# Patient Record
Sex: MALE | URBAN - METROPOLITAN AREA
[De-identification: names, ages, dates, MRNs, and addresses within clinical notes are randomized per-mention and may not be internally consistent; named-entity substitution may affect disease eponyms.]

---

## 2024-04-19 ENCOUNTER — EUS (OUTPATIENT)
Dept: URBAN - METROPOLITAN AREA MEDICAL CENTER 25 | Facility: MEDICAL CENTER | Age: 74
End: 2024-04-19
Payer: MEDICARE

## 2024-04-19 DIAGNOSIS — K86.89 ACUTE PANCREATIC FLUID COLLECTION: ICD-10-CM

## 2024-04-19 DIAGNOSIS — K29.60 ADENOPAPILLOMATOSIS GASTRICA: ICD-10-CM

## 2024-04-19 DIAGNOSIS — K31.7 BENIGN GASTRIC POLYP: ICD-10-CM

## 2024-04-19 PROCEDURE — 43251 EGD REMOVE LESION SNARE: CPT | Performed by: INTERNAL MEDICINE

## 2024-04-19 PROCEDURE — 43238 EGD US FINE NEEDLE BX/ASPIR: CPT | Performed by: INTERNAL MEDICINE

## 2024-05-02 ENCOUNTER — OFFICE VISIT (OUTPATIENT)
Dept: URBAN - METROPOLITAN AREA CLINIC 19 | Facility: CLINIC | Age: 74
End: 2024-05-02

## 2025-02-14 ENCOUNTER — WEB ENCOUNTER (OUTPATIENT)
Dept: URBAN - METROPOLITAN AREA CLINIC 19 | Facility: CLINIC | Age: 75
End: 2025-02-14

## 2025-02-19 ENCOUNTER — LAB OUTSIDE AN ENCOUNTER (OUTPATIENT)
Dept: URBAN - METROPOLITAN AREA CLINIC 19 | Facility: CLINIC | Age: 75
End: 2025-02-19

## 2025-02-20 ENCOUNTER — OFFICE VISIT (OUTPATIENT)
Dept: URBAN - METROPOLITAN AREA CLINIC 19 | Facility: CLINIC | Age: 75
End: 2025-02-20
Payer: MEDICARE

## 2025-02-20 ENCOUNTER — LAB OUTSIDE AN ENCOUNTER (OUTPATIENT)
Dept: URBAN - METROPOLITAN AREA CLINIC 19 | Facility: CLINIC | Age: 75
End: 2025-02-20

## 2025-02-20 ENCOUNTER — DASHBOARD ENCOUNTERS (OUTPATIENT)
Age: 75
End: 2025-02-20

## 2025-02-20 VITALS
TEMPERATURE: 98.1 F | HEIGHT: 69 IN | BODY MASS INDEX: 44.94 KG/M2 | DIASTOLIC BLOOD PRESSURE: 80 MMHG | SYSTOLIC BLOOD PRESSURE: 128 MMHG | OXYGEN SATURATION: 97 % | HEART RATE: 81 BPM | WEIGHT: 303.4 LBS

## 2025-02-20 DIAGNOSIS — D69.6 LOW PLATELET COUNT: ICD-10-CM

## 2025-02-20 DIAGNOSIS — K86.89 PANCREATIC MASS: ICD-10-CM

## 2025-02-20 PROCEDURE — 99243 OFF/OP CNSLTJ NEW/EST LOW 30: CPT

## 2025-02-20 PROCEDURE — 99213 OFFICE O/P EST LOW 20 MIN: CPT

## 2025-02-20 RX ORDER — AMLODIPINE BESYLATE 5 MG/1
1 TABLET TABLET ORAL ONCE A DAY
Status: ACTIVE | COMMUNITY

## 2025-02-20 RX ORDER — APIXABAN 5 MG/1
AS DIRECTED TABLET, FILM COATED ORAL
Status: ACTIVE | COMMUNITY

## 2025-02-20 RX ORDER — TRAZODONE HYDROCHLORIDE 50 MG/1
1 TABLET AT BEDTIME AS NEEDED TABLET ORAL ONCE A DAY
Status: ACTIVE | COMMUNITY

## 2025-02-20 RX ORDER — METOPROLOL TARTRATE 25 MG/1
1 TABLET WITH FOOD TABLET, FILM COATED ORAL TWICE A DAY
Status: ACTIVE | COMMUNITY

## 2025-02-20 RX ORDER — POTASSIUM CHLORIDE 750 MG/1
1 TABLET WITH FOOD TABLET, FILM COATED, EXTENDED RELEASE ORAL TWICE A DAY
Status: ACTIVE | COMMUNITY

## 2025-02-20 RX ORDER — CLONAZEPAM 0.5 MG/1
1 TABLET TABLET ORAL ONCE A DAY
Status: ACTIVE | COMMUNITY

## 2025-02-20 RX ORDER — ATORVASTATIN CALCIUM 20 MG/1
1 TABLET TABLET, FILM COATED ORAL ONCE A DAY
Status: ACTIVE | COMMUNITY

## 2025-02-20 NOTE — PHYSICAL EXAM GASTROINTESTINAL
soft, nontender, very large abdomen,  no guarding or rigidity,  no masses palpable,  normal bowel sounds,  no hepatosplenomegaly,  no rebound tenderness

## 2025-02-20 NOTE — HPI-TODAY'S VISIT:
Mr. San is a 74-year-old male with PMH of PE, RLE DVT appeared to be provoked and was treated with Coumadin until 2019 (placed on Eliquis but he stopped January 2024 due to cost issues), osteomyelitis, HTN, smoker 2 PPD for 15 years, PVD with chronic lower extremity edema, and morbid obesity  was referred by Dr. Natalie Branham to schedule an EGD/EUS for reevaluation of pancreatic mass and also to evaluate for cirrhosis as his platelet count continues to decrease.  A copy of this note will be sent to the referring provider.  Patient had a bronchoscopy on 3/26/2024 and cytology of his right upper lobe nodule showed no malignancy.  FNA right upper lobe lung nodule again showed no evidence of malignancy. On 4/1/2024 his PET scan noted focus on FDG avidity in the pancreatic head, suspicious for an underlying pancreatic mass/pancreatic malignancy. MRI abdomen with contrast recommended for further evaluation. On 4/17/2024 he is upper EUS with Dr. Barrientos noted normal esophagus, chronic erosive gastritis with hemorrhage, and a single gastric polyp, normal examined duodenum. A mass was identified in the pancreatic head. This was staged T2 N0 MX. There was no sign of significant pathology in the CBD. Pancreatic head FNA negative for malignant cells. Benign ductal epithelium with reactive changes.  MRI abdomen 4/18/24 noting 2.2 x 1.6 cm with early enhancement suggestive of islet cell tumor, no metastatic disease. 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  No pancreas duct dilatation or peripancreatic inflammatory changes.  His platelet count has been decreasing since July was 100 k and now 40 k. Denies abdominal pain. He is having 2 bowel movements a week. Says he eats 2 meals a day and at times just one. Has never had a colonoscopy and is adamant about not having one. No FH of colon cancer. No rectal bleeding or melena.  2/14/2025 labs: Platelets 48. Normal CMP, low iron 50, binding capacity 201, CA 19-9 33, CEA 9.5  7/17/2024 labs: Platelets 100

## 2025-02-25 LAB
A/G RATIO: 1.1
ABSOLUTE BASOPHILS: 50
ABSOLUTE EOSINOPHILS: 170
ABSOLUTE LYMPHOCYTES: 1470
ABSOLUTE MONOCYTES: 646
ABSOLUTE NEUTROPHILS: 4764
AFP, SERUM: 1.8
AFP-L3%, SERUM: (no result)
ALBUMIN: 3.3
ALKALINE PHOSPHATASE: 102
ALT (SGPT): 4
AST (SGOT): 9
BASOPHILS: 0.7
BILIRUBIN, TOTAL: 0.4
BUN/CREATININE RATIO: (no result)
BUN: 16
CALCIUM: 8.5
CARBON DIOXIDE, TOTAL: 31
CHLORIDE: 102
COMMENT(S): (no result)
CREATININE: 1.05
DCP (DES GAMMA CARBOXY: 0.2
EGFR: 75
EOSINOPHILS: 2.4
GLOBULIN, TOTAL: 3.1
GLUCOSE: 79
HEMATOCRIT: 37.6
HEMOGLOBIN: 12.5
INR: 1.1
LYMPHOCYTES: 20.7
MCH: 29.8
MCHC: 33.2
MCV: 89.7
MONOCYTES: 9.1
NEUTROPHILS: 67.1
PLATELET COUNT: (no result)
POTASSIUM: 4.2
PROTEIN, TOTAL: 6.4
PT: 11.8
RDW: 12.6
RED BLOOD CELL COUNT: 4.19
SODIUM: 140
WHITE BLOOD CELL COUNT: 7.1

## 2025-02-27 ENCOUNTER — TELEPHONE ENCOUNTER (OUTPATIENT)
Dept: URBAN - METROPOLITAN AREA CLINIC 19 | Facility: CLINIC | Age: 75
End: 2025-02-27